# Patient Record
Sex: FEMALE | Race: WHITE | NOT HISPANIC OR LATINO | URBAN - METROPOLITAN AREA
[De-identification: names, ages, dates, MRNs, and addresses within clinical notes are randomized per-mention and may not be internally consistent; named-entity substitution may affect disease eponyms.]

---

## 2022-06-09 ENCOUNTER — OFFICE (OUTPATIENT)
Dept: URBAN - METROPOLITAN AREA CLINIC 5 | Facility: CLINIC | Age: 27
End: 2022-06-09
Payer: COMMERCIAL

## 2022-06-09 VITALS
HEART RATE: 82 BPM | HEIGHT: 64 IN | WEIGHT: 70 LBS | DIASTOLIC BLOOD PRESSURE: 66 MMHG | OXYGEN SATURATION: 97 % | TEMPERATURE: 97.1 F | SYSTOLIC BLOOD PRESSURE: 92 MMHG

## 2022-06-09 DIAGNOSIS — R63.4 ABNORMAL WEIGHT LOSS: ICD-10-CM

## 2022-06-09 DIAGNOSIS — E10.43 TYPE 1 DIABETES MELLITUS WITH DIABETIC AUTONOMIC (POLY)NEURO: ICD-10-CM

## 2022-06-09 DIAGNOSIS — K31.84 GASTROPARESIS: ICD-10-CM

## 2022-06-09 DIAGNOSIS — Z98.84 BARIATRIC SURGERY STATUS: ICD-10-CM

## 2022-06-09 DIAGNOSIS — R10.10 UPPER ABDOMINAL PAIN, UNSPECIFIED: ICD-10-CM

## 2022-06-09 PROCEDURE — 99204 OFFICE O/P NEW MOD 45 MIN: CPT | Performed by: INTERNAL MEDICINE

## 2022-06-09 RX ORDER — PANTOPRAZOLE SODIUM 40 MG/1
40 TABLET, DELAYED RELEASE ORAL
Qty: 30 | Refills: 11 | Status: ACTIVE
Start: 2022-06-09

## 2022-07-12 ENCOUNTER — ON CAMPUS - OUTPATIENT (OUTPATIENT)
Dept: URBAN - METROPOLITAN AREA HOSPITAL 108 | Facility: HOSPITAL | Age: 27
End: 2022-07-12
Payer: COMMERCIAL

## 2022-07-12 DIAGNOSIS — Z98.84 BARIATRIC SURGERY STATUS: ICD-10-CM

## 2022-07-12 DIAGNOSIS — R63.4 ABNORMAL WEIGHT LOSS: ICD-10-CM

## 2022-07-12 DIAGNOSIS — E46 UNSPECIFIED PROTEIN-CALORIE MALNUTRITION: ICD-10-CM

## 2022-07-12 PROCEDURE — 43241 EGD TUBE/CATH INSERTION: CPT | Performed by: INTERNAL MEDICINE

## 2022-07-13 ENCOUNTER — OFFICE (OUTPATIENT)
Dept: URBAN - METROPOLITAN AREA CLINIC 75 | Facility: CLINIC | Age: 27
End: 2022-07-13

## 2022-07-13 DIAGNOSIS — R63.4 ABNORMAL WEIGHT LOSS: ICD-10-CM

## 2022-08-12 ENCOUNTER — OFFICE (OUTPATIENT)
Dept: URBAN - METROPOLITAN AREA CLINIC 75 | Facility: CLINIC | Age: 27
End: 2022-08-12
Payer: COMMERCIAL

## 2022-08-12 VITALS
SYSTOLIC BLOOD PRESSURE: 110 MMHG | OXYGEN SATURATION: 98 % | DIASTOLIC BLOOD PRESSURE: 70 MMHG | HEART RATE: 107 BPM | HEIGHT: 64 IN | WEIGHT: 73 LBS

## 2022-08-12 DIAGNOSIS — E43 UNSPECIFIED SEVERE PROTEIN-CALORIE MALNUTRITION: ICD-10-CM

## 2022-08-12 PROCEDURE — 99214 OFFICE O/P EST MOD 30 MIN: CPT | Performed by: NURSE PRACTITIONER

## 2022-09-28 ENCOUNTER — OFFICE (OUTPATIENT)
Dept: URBAN - METROPOLITAN AREA CLINIC 75 | Facility: CLINIC | Age: 27
End: 2022-09-28
Payer: COMMERCIAL

## 2022-09-28 VITALS — WEIGHT: 71 LBS | HEIGHT: 64 IN

## 2022-09-28 DIAGNOSIS — K31.84 GASTROPARESIS: ICD-10-CM

## 2022-09-28 DIAGNOSIS — R63.4 ABNORMAL WEIGHT LOSS: ICD-10-CM

## 2022-09-28 DIAGNOSIS — E43 UNSPECIFIED SEVERE PROTEIN-CALORIE MALNUTRITION: ICD-10-CM

## 2022-09-28 DIAGNOSIS — E10.43 TYPE 1 DIABETES MELLITUS WITH DIABETIC AUTONOMIC (POLY)NEURO: ICD-10-CM

## 2022-09-28 PROCEDURE — 99214 OFFICE O/P EST MOD 30 MIN: CPT | Performed by: INTERNAL MEDICINE

## 2023-10-24 ENCOUNTER — OFFICE VISIT (OUTPATIENT)
Dept: GASTROENTEROLOGY | Facility: CLINIC | Age: 28
End: 2023-10-24
Payer: COMMERCIAL

## 2023-10-24 VITALS
HEART RATE: 101 BPM | DIASTOLIC BLOOD PRESSURE: 105 MMHG | HEIGHT: 64 IN | BODY MASS INDEX: 15.43 KG/M2 | WEIGHT: 90.4 LBS | SYSTOLIC BLOOD PRESSURE: 157 MMHG

## 2023-10-24 DIAGNOSIS — R10.84 GENERALIZED ABDOMINAL PAIN: ICD-10-CM

## 2023-10-24 DIAGNOSIS — R11.0 NAUSEA: ICD-10-CM

## 2023-10-24 DIAGNOSIS — K21.9 GASTROESOPHAGEAL REFLUX DISEASE, UNSPECIFIED WHETHER ESOPHAGITIS PRESENT: ICD-10-CM

## 2023-10-24 DIAGNOSIS — K31.84 GASTROPARESIS: Primary | ICD-10-CM

## 2023-10-24 PROBLEM — R55 SYNCOPE: Status: ACTIVE | Noted: 2020-07-25

## 2023-10-24 PROCEDURE — 1159F MED LIST DOCD IN RCRD: CPT | Performed by: NURSE PRACTITIONER

## 2023-10-24 PROCEDURE — 99204 OFFICE O/P NEW MOD 45 MIN: CPT | Performed by: NURSE PRACTITIONER

## 2023-10-24 PROCEDURE — 1160F RVW MEDS BY RX/DR IN RCRD: CPT | Performed by: NURSE PRACTITIONER

## 2023-10-24 RX ORDER — FAMOTIDINE 40 MG/1
TABLET, FILM COATED ORAL
COMMUNITY
Start: 2023-10-02

## 2023-10-24 RX ORDER — PROCHLORPERAZINE 25 MG/1
SUPPOSITORY RECTAL
COMMUNITY
Start: 2023-10-03

## 2023-10-24 RX ORDER — OMEPRAZOLE 20 MG/1
TABLET, DELAYED RELEASE ORAL
COMMUNITY
End: 2023-10-24 | Stop reason: SDUPTHER

## 2023-10-24 RX ORDER — LEVOTHYROXINE SODIUM 0.07 MG/1
TABLET ORAL
COMMUNITY
Start: 2023-10-07

## 2023-10-24 RX ORDER — CHOLECALCIFEROL (VITAMIN D3) 50 MCG
CAPSULE ORAL
COMMUNITY
Start: 2023-10-02

## 2023-10-24 RX ORDER — OMEPRAZOLE 20 MG/1
20 TABLET, DELAYED RELEASE ORAL DAILY
Qty: 90 TABLET | Refills: 2 | Status: SHIPPED | OUTPATIENT
Start: 2023-10-24

## 2023-10-24 RX ORDER — ONDANSETRON 8 MG/1
8 TABLET, ORALLY DISINTEGRATING ORAL EVERY 8 HOURS PRN
Qty: 30 TABLET | Refills: 1 | Status: SHIPPED | OUTPATIENT
Start: 2023-10-24

## 2023-10-24 RX ORDER — LISINOPRIL 2.5 MG/1
TABLET ORAL
COMMUNITY
Start: 2023-08-08

## 2023-10-24 RX ORDER — INSULIN LISPRO 100 [IU]/ML
INJECTION, SOLUTION INTRAVENOUS; SUBCUTANEOUS
COMMUNITY
Start: 2023-08-07

## 2023-10-24 NOTE — PROGRESS NOTES
"Chief Complaint     Abdominal Pain, Weight Loss, Nausea, and Elevated Hepatic Enzymes    History of Present Illness     Wanda Rivers is a 28 y.o. female who presents to [facility] on referral from Emily Arthur,Wolfgang for a gastroenterology evaluation of abdominal pain, nausea and weight loss.      Past medical history includes diabetes, gastric bypass, thyroid disease, anxiety and depression.  She reports being diagnosed with type 2 diabetes in .  It was recommended that she undergo gastric bypass for weight loss at this time to help with type 2 diabetes.  Reports that she weighed 201 pounds.  Underwent gastric bypass in  and \"almost .\"  Then was diagnosed with type 1 diabetes.  In  she was diagnosed with gastroparesis and was continuing to lose weight.  Her lowest weight was 69 pounds following the death of her father.  In  all of her teeth were removed.  Last July NJ tube was placed at [facility] however patient states that her weight loss worsened after her feeding tube placement and she requested that it be removed in October.  Recently received insulin pump and reports gaining 4 pounds since starting insulin pump.  She does not follow a gastroparesis diet and reports that she is really unsure of what gastroparesis diet consists of.    Reports upper abdominal pain that is worse following meals.  When pain is present it is improved with a heating pad and smoking marijuana.    Admits daily nausea.  Reports vomiting that's present if she eats too much or drinks too much with her food.  Will sometimes wake up with nausea and vomiting.      Reports that she's having a bowel movement every other day.      Reports that her weight is currently stable.  She has tried protein shakes in the past bust states that it hurts her stomach and gives her diarrhea.         History      Past Medical History:   Diagnosis Date    Diabetes mellitus type 1     Gastroparesis  " "      Past Surgical History:   Procedure Laterality Date    COLONOSCOPY      GASTRIC BYPASS      SMALL INTESTINE SURGERY      UPPER GASTROINTESTINAL ENDOSCOPY         Family History   Problem Relation Age of Onset    Colon cancer Neg Hx         Current Medications        Current Outpatient Medications:     Continuous Blood Gluc  (Dexcom G6 ) device, , Disp: , Rfl:     D3 Super Strength 50 MCG (2000 UT) capsule, , Disp: , Rfl:     famotidine (PEPCID) 40 MG tablet, , Disp: , Rfl:     Insulin Glargine (LANTUS SOLOSTAR) 100 UNIT/ML injection pen, Inject  under the skin into the appropriate area as directed Daily., Disp: , Rfl:     Insulin Lispro, 1 Unit Dial, (HUMALOG) 100 UNIT/ML solution pen-injector, , Disp: , Rfl:     levothyroxine (SYNTHROID, LEVOTHROID) 75 MCG tablet, , Disp: , Rfl:     lisinopril (PRINIVIL,ZESTRIL) 2.5 MG tablet, , Disp: , Rfl:     omeprazole OTC (PriLOSEC OTC) 20 MG EC tablet, Take 1 tablet by mouth Daily., Disp: 90 tablet, Rfl: 2    ondansetron ODT (ZOFRAN-ODT) 8 MG disintegrating tablet, Place 1 tablet on the tongue Every 8 (Eight) Hours As Needed for Nausea or Vomiting., Disp: 30 tablet, Rfl: 1     Allergies     No Known Allergies    Social History       Social History     Social History Narrative    Not on file       Immunizations     Immunization:    There is no immunization history on file for this patient.       Objective     Objective     Vital Signs:   BP (!) 157/105 (BP Location: Right arm, Patient Position: Sitting, Cuff Size: Small Adult)   Pulse 101   Ht 162.6 cm (64\")   Wt 41 kg (90 lb 6.4 oz)   BMI 15.52 kg/m²       Physical Exam    Results      Result Review :   The following data was reviewed by: ROSSI Celestin on 10/24/2023:    8/10/2023 CMP: Creatinine 0.61, alk phos 115, AST 67, ALT 72, total bilirubin 0.2.  CBC: Hemoglobin 10.4, hematocrit 35.6, platelets 402.  Hemoglobin A1c 9.7.    7/12/2022 EGD with NJ tube placement.  Normal esophagus.  " Examined duodenum was normal.  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  NJ tube placed.    9/1/2016 EGD/colonoscopy-normal esophagus.  Evidence of gastric bypass.  Normal stomach and jejunum.  small bowel biopsy with unremarkable mucosa.  Gastric biopsy-mild chronic gastritis. Normal colonoscopy.                 Assessment and Plan        Assessment and Plan    Diagnoses and all orders for this visit:    1. Gastroparesis (Primary)  -     NM Gastric Emptying; Future    2. Gastroesophageal reflux disease, unspecified whether esophagitis present  -     omeprazole OTC (PriLOSEC OTC) 20 MG EC tablet; Take 1 tablet by mouth Daily.  Dispense: 90 tablet; Refill: 2    3. Generalized abdominal pain  -     NM Gastric Emptying; Future    4. Nausea  -     NM Gastric Emptying; Future  -     ondansetron ODT (ZOFRAN-ODT) 8 MG disintegrating tablet; Place 1 tablet on the tongue Every 8 (Eight) Hours As Needed for Nausea or Vomiting.  Dispense: 30 tablet; Refill: 1        Follow Up        Follow Up   Return in about 6 months (around 4/24/2024) for abdominal pain.  Patient was given instructions and counseling regarding her condition or for health maintenance advice. Please see specific information pulled into the AVS if appropriate.

## 2023-11-03 ENCOUNTER — PATIENT ROUNDING (BHMG ONLY) (OUTPATIENT)
Dept: GASTROENTEROLOGY | Facility: CLINIC | Age: 28
End: 2023-11-03
Payer: COMMERCIAL

## 2023-11-03 NOTE — PROGRESS NOTES
11/3/2023      Hello, may I speak with Wanda Rivers     My name is Berta. I am calling from [facility]. I show that you had a recent visit with ROSSI Coronado.    Before we get started may I verify your date of birth? 1995    I am calling to officially welcome you to our practice and ask about your recent visit. Is this a good time to talk? Left voicemail     Tell me about your visit with us. What things went well?    We strive to ensure that we protect your safety and privacy. Is there anything we could have done to improve this during your visit?        We're always looking for ways to make our patients' experiences even better. Do you have recommendations on ways we may improve?    Overall were you satisfied with your first visit to our practice?    I appreciate you taking the time to speak with me today. Is there anything else I can do for you?    I am glad to hear that you had a very good visit and I appreciate you taking the time to provide feedback on this call. We would greatly appreciate you filling out a survey if you receive one in the mail, email or text. This is a great opportunity to provide any additional feedback that you may think of after this call as well.       Thank you, and have a great day.

## 2023-12-01 ENCOUNTER — TELEPHONE (OUTPATIENT)
Dept: GASTROENTEROLOGY | Facility: CLINIC | Age: 28
End: 2023-12-01
Payer: COMMERCIAL

## 2023-12-01 ENCOUNTER — HOSPITAL ENCOUNTER (OUTPATIENT)
Dept: NUCLEAR MEDICINE | Facility: HOSPITAL | Age: 28
Discharge: HOME | End: 2023-12-01
Payer: COMMERCIAL

## 2023-12-01 DIAGNOSIS — R10.84 GENERALIZED ABDOMINAL PAIN: ICD-10-CM

## 2023-12-01 DIAGNOSIS — K31.84 GASTROPARESIS: ICD-10-CM

## 2023-12-01 DIAGNOSIS — R11.0 NAUSEA: ICD-10-CM

## 2023-12-01 PROCEDURE — 78264 GASTRIC EMPTYING IMG STUDY: CPT

## 2023-12-01 PROCEDURE — 0 TECHNETIUM SULFUR COLLOID: Performed by: NURSE PRACTITIONER

## 2023-12-01 PROCEDURE — A9541 TC99M SULFUR COLLOID: HCPCS | Performed by: NURSE PRACTITIONER

## 2023-12-01 RX ADMIN — TECHNETIUM TC 99M SULFUR COLLOID 1 DOSE: KIT at 07:36

## 2023-12-01 NOTE — TELEPHONE ENCOUNTER
----- Message from ROSSI Celestin sent at 12/1/2023 10:41 AM EST -----  GES with normal gastric emptying time.

## 2023-12-04 NOTE — TELEPHONE ENCOUNTER
Pt is aware of providers response,  pt asked if Gastroparesis comes back consulted with Kandi RICHEY who states with the pt being diabetic if this is not managed it can damage the stomach and cause gastroparesis to return. Pt states understanding    no history of blood product transfusion

## 2023-12-04 NOTE — TELEPHONE ENCOUNTER
It can improve over time.  Gastric emptying time varies based on many factors.  If she had a previous gastric emptying study that showed a delay, it could have been related to something else (medications, activity, etc) but for now it appears that her gastric emptying time is normal.

## 2024-11-12 ENCOUNTER — TELEPHONE (OUTPATIENT)
Dept: GASTROENTEROLOGY | Facility: CLINIC | Age: 29
End: 2024-11-12

## 2024-11-12 ENCOUNTER — OFFICE VISIT (OUTPATIENT)
Dept: GASTROENTEROLOGY | Facility: CLINIC | Age: 29
End: 2024-11-12
Payer: MEDICARE

## 2024-11-12 VITALS
HEART RATE: 112 BPM | BODY MASS INDEX: 17.51 KG/M2 | OXYGEN SATURATION: 98 % | WEIGHT: 102 LBS | SYSTOLIC BLOOD PRESSURE: 112 MMHG | DIASTOLIC BLOOD PRESSURE: 84 MMHG

## 2024-11-12 DIAGNOSIS — T78.49XA OTHER ALLERGY, INITIAL ENCOUNTER: ICD-10-CM

## 2024-11-12 DIAGNOSIS — R11.2 NAUSEA AND VOMITING, UNSPECIFIED VOMITING TYPE: ICD-10-CM

## 2024-11-12 DIAGNOSIS — R19.4 ALTERED BOWEL HABITS: Primary | ICD-10-CM

## 2024-11-12 DIAGNOSIS — R19.7 DIARRHEA, UNSPECIFIED TYPE: ICD-10-CM

## 2024-11-12 DIAGNOSIS — R10.11 RUQ PAIN: ICD-10-CM

## 2024-11-12 PROCEDURE — 1160F RVW MEDS BY RX/DR IN RCRD: CPT

## 2024-11-12 PROCEDURE — 99214 OFFICE O/P EST MOD 30 MIN: CPT

## 2024-11-12 PROCEDURE — 1159F MED LIST DOCD IN RCRD: CPT

## 2024-11-12 RX ORDER — GABAPENTIN 300 MG/1
CAPSULE ORAL
COMMUNITY
Start: 2024-05-28

## 2024-11-12 RX ORDER — PROCHLORPERAZINE 25 MG/1
SUPPOSITORY RECTAL
COMMUNITY
Start: 2024-10-23

## 2024-11-12 RX ORDER — SODIUM PICOSULFATE, MAGNESIUM OXIDE, AND ANHYDROUS CITRIC ACID 10; 3.5; 12 MG/160ML; G/160ML; G/160ML
1 LIQUID ORAL 2 TIMES DAILY
Qty: 320 ML | Refills: 0 | Status: SHIPPED | OUTPATIENT
Start: 2024-11-12 | End: 2024-11-13

## 2024-11-12 RX ORDER — BUSPIRONE HYDROCHLORIDE 5 MG/1
TABLET ORAL
COMMUNITY
Start: 2024-11-07

## 2024-11-12 RX ORDER — INSULIN ASPART 100 [IU]/ML
INJECTION, SOLUTION INTRAVENOUS; SUBCUTANEOUS
COMMUNITY

## 2024-11-12 RX ORDER — INSULIN PMP CART,AUT,G6/7,CNTR
EACH SUBCUTANEOUS
COMMUNITY
Start: 2024-11-07

## 2024-11-12 RX ORDER — GLUCAGON INJECTION, SOLUTION 1 MG/.2ML
INJECTION, SOLUTION SUBCUTANEOUS
COMMUNITY

## 2024-11-12 RX ORDER — SERTRALINE HYDROCHLORIDE 100 MG/1
TABLET, FILM COATED ORAL
COMMUNITY
Start: 2024-11-07

## 2024-11-12 NOTE — TELEPHONE ENCOUNTER
Wanda Rivers, 1995, has requested to transfer care from ROSSI Coronado to ROSSI Hanna.    Reason for transfer: Pt was in office and would like to change providers    Please review the patients records for possible transfer of care. The patient is aware that it is at the receiving provider's discretion to approve or deny this transfer request.       ROSSI Berrios verbally approved transfer

## 2024-11-12 NOTE — PROGRESS NOTES
"Chief Complaint     Diabetic gastroparesis (associated with type 1 diabetes mellitus/), decreased appetite (Pt every few months pt sates she is unable to eat anything, it causes nausea, abd pain, light headed/dizzy and pt states an increased heart rate when this happens), Abdominal Pain (Pt states when she eats or drink anything she will have abd pain), and Diarrhea    History of Present Illness     Wanda Rivers is a 29 y.o. female with PMH diabetes, gastric bypass, thyroid disease, who presents to [facility] on referral from Saima Truong MD for a gastroenterology evaluation of diabetic gastroparesis associated with type 1 diabetes mellitus..      HPI:  Patient was previously seen by ROSSI Coronado for diabetic gastroparesis associated with type 1 diabetes mellitus. She reports being diagnosed with type 2 diabetes in .  It was recommended that she undergo gastric bypass for weight loss at this time to help with type 2 diabetes.  Reports that she weighed 201 pounds.  Underwent gastric bypass in  and \"almost .\"  Then was diagnosed with type 1 diabetes. In  she was diagnosed with gastroparesis and was continuing to lose weight.  Her lowest weight was 69 pounds following the death of her father. In  all of her teeth were removed. Last July NJ tube was placed at [facility] however patient states that her weight loss worsened after her feeding tube placement and she requested that it be removed in October.  Reports upper abdominal pain that is worse following meals.  When pain is present it is improved with a heating pad and smoking marijuana. She reported trouble with nausea and vomiting after meals. Her symptoms improved after Omnipod and Dexcom use for the management of her diabetes.     2023 NM gastric emptying study: normal.     2024 Office Visit: Patient reports episodes of nausea, vomiting, dry heaving, and abdominal pain that can be " "trigger by eating and drinking. She explains these episodes cause her to lose weight. She explains she is current at the end of a recent \"episode\". Over the past two and a half weeks she lost 14 lbs. Experiences breakthrough heartburn despite taking Pepcid and Omeprazole. Denies trouble swallowing. Over the past few months her diet consists of eating eggs, peanut butter sandwiches, Ramen noodles, grilled cheese, Wendys, and Dunkins. She reports her A1C is 6.2 down from 12 in Dec 2023. Her average blood sugar level is 128 on her Dexcom. Reports GI intolerance to protein supplements.     Her typical bowel regiment consists of 1 BM every 1-2 days. Typical stool is formed, semi-hard, and light brown. During an \"episode\" stool described as diarrhea, mucoid, with variations of yellow, tan, and brown in color. Denies hematochezia or melena. She reports epigastric pain and RUQ pain described as a burning and sore pain, aggravated by eating. Alleviated by heat and massages. Lower abdominal pain described as aching, cramping, burning, and stabbing pain. Relieved with back massage. Denies aggravating factors.     Patient denies family history of colon cancer or of colon polyps. Patient's last EGD/Colonoscopy 7/12/2022 and had a feeding tube placed (removed after 2-3 months).     History      Past Medical History:   Diagnosis Date    Diabetes mellitus type 1     Gastroparesis        Past Surgical History:   Procedure Laterality Date    COLONOSCOPY      GASTRIC BYPASS      SMALL INTESTINE SURGERY      UPPER GASTROINTESTINAL ENDOSCOPY         Family History   Problem Relation Age of Onset    Colon cancer Neg Hx         Current Medications        Current Outpatient Medications:     Continuous Blood Gluc  (Dexcom G6 ) device, , Disp: , Rfl:     Continuous Glucose Sensor (Dexcom G6 Sensor), , Disp: , Rfl:     Continuous Glucose Transmitter (Dexcom G6 Transmitter) misc, , Disp: , Rfl:     D3 Super Strength 50 MCG " (2000 UT) capsule, , Disp: , Rfl:     famotidine (PEPCID) 40 MG tablet, , Disp: , Rfl:     Glucagon (Gvoke Kit) 1 MG/0.2ML solution, , Disp: , Rfl:     Insulin Disposable Pump (Omnipod 5 [id number] Pods Gen 5) misc, , Disp: , Rfl:     ondansetron ODT (ZOFRAN-ODT) 8 MG disintegrating tablet, Place 1 tablet on the tongue Every 8 (Eight) Hours As Needed for Nausea or Vomiting., Disp: 30 tablet, Rfl: 1    sertraline (ZOLOFT) 100 MG tablet, TAKE 1 TABLET BY MOUTH DAILY FOR MOOD, Disp: , Rfl:     busPIRone (BUSPAR) 5 MG tablet, TAKE 1 TABLET BY MOUTH 2 TIMES A DAY FOR ANXIETY (Patient not taking: Reported on 11/12/2024), Disp: , Rfl:     Diclofenac Sodium (VOLTAREN) 1 % gel gel, , Disp: , Rfl:     gabapentin (NEURONTIN) 300 MG capsule, Take 1 capsule 3 times a day by oral route for 7 days. (Patient not taking: Reported on 11/12/2024), Disp: , Rfl:     Insulin Glargine (LANTUS SOLOSTAR) 100 UNIT/ML injection pen, Inject  under the skin into the appropriate area as directed Daily., Disp: , Rfl:     Insulin Lispro, 1 Unit Dial, (HUMALOG) 100 UNIT/ML solution pen-injector, , Disp: , Rfl:     levothyroxine (SYNTHROID, LEVOTHROID) 75 MCG tablet, , Disp: , Rfl:     lisinopril (PRINIVIL,ZESTRIL) 2.5 MG tablet, , Disp: , Rfl:     NovoLOG 100 UNIT/ML injection, , Disp: , Rfl:     omeprazole OTC (PriLOSEC OTC) 20 MG EC tablet, Take 1 tablet by mouth Daily. (Patient not taking: Reported on 11/12/2024), Disp: 90 tablet, Rfl: 2    sertraline (ZOLOFT) 50 MG tablet, , Disp: , Rfl:     Sod Picosulfate-Mag Ox-Cit Acd (Clenpiq) 10-3.5-12 MG-GM -GM/160ML solution, Take 160 mL by mouth 2 (Two) Times a Day for 1 day. Take per office instructions, Disp: 320 mL, Rfl: 0     Allergies     No Known Allergies    Social History       Social History     Social History Narrative    Not on file       Immunizations     Immunization:    There is no immunization history on file for this patient.       Objective     Objective     Vital Signs:   /84 (BP  "Location: Right arm, Patient Position: Sitting, Cuff Size: Adult)   Pulse 112   Wt 46.3 kg (102 lb)   SpO2 98%   BMI 17.51 kg/m²       Physical Exam  HENT:      Head: Normocephalic.   Eyes:      Pupils: Pupils are equal, round, and reactive to light.   Cardiovascular:      Rate and Rhythm: Normal rate.   Pulmonary:      Effort: Pulmonary effort is normal.   Abdominal:      General: Abdomen is flat.      Palpations: Abdomen is soft.      Tenderness: There is abdominal tenderness in the right upper quadrant and epigastric area.   Musculoskeletal:         General: Normal range of motion.   Neurological:      General: No focal deficit present.      Mental Status: She is alert.   Psychiatric:         Mood and Affect: Mood normal.         Results      Result Review :   The following data was reviewed by: ROSSI Hanna on 11/12/2024:    No results for input(s): \"WBC\", \"HGB\", \"MCV\", \"PLT\" in the last 44337 hours.    Invalid input(s): \"NEUT\"      No results for input(s): \"BUN\", \"CREATININE\", \"NA\", \"K\", \"CL\", \"CO2\", \"GLUCOSE\" in the last 75457 hours.   No results for input(s): \"PROTIME\", \"INR\", \"PTT\" in the last 09059 hours.  No results for input(s): \"AST\", \"ALT\", \"ALKPHOS\", \"BILITOT\", \"BILIDIR\", \"PROTEINTOT\", \"ALBUMIN\" in the last 08669 hours.   No results for input(s): \"IRON\", \"TRANSFERRIN\", \"TIBC\", \"FERRITIN\", \"JGTXEQVC65\", \"FOLATE\" in the last 51031 hours.  No results for input(s): \"HAV\", \"HEPAIGM\", \"HEPBIGM\", \"HEPBCAB\", \"HBEAG\", \"HEPCAB\" in the last 73705 hours.    No Images in the past 120 days found..       Assessment and Plan        Assessment and Plan    Diagnoses and all orders for this visit:    1. Altered bowel habits (Primary)  -     CBC (No Diff)  -     Comprehensive Metabolic Panel  -     Case Request; Standing  -     Case Request  -     Sod Picosulfate-Mag Ox-Cit Acd (Clenpiq) 10-3.5-12 MG-GM -GM/160ML solution; Take 160 mL by mouth 2 (Two) Times a Day for 1 day. Take per office " instructions  Dispense: 320 mL; Refill: 0    2. Diarrhea, unspecified type  -     Enteric Bacterial Panel - Stool, Per Rectum; Future  -     Enteric Parasite Panel - Stool, Per Rectum; Future  -     Fecal Lactoferrin Qual. - Stool, Per Rectum; Future  -     Clostridioides difficile Toxin - Stool, Per Rectum; Future  -     Occult Blood, Fecal By Immunoassay - Stool, Per Rectum; Future  -     Calprotectin, Fecal - Stool, Per Rectum; Future  -     Pancreatic Elastase, Fecal - Stool, Per Rectum; Future  -     Case Request; Standing  -     Case Request  -     Sod Picosulfate-Mag Ox-Cit Acd (Clenpiq) 10-3.5-12 MG-GM -GM/160ML solution; Take 160 mL by mouth 2 (Two) Times a Day for 1 day. Take per office instructions  Dispense: 320 mL; Refill: 0    3. Nausea and vomiting, unspecified vomiting type  -     Alpha-Gal IgE Panel; Future  -     Celiac Disease Panel; Future  -     Case Request; Standing  -     Case Request  -     Sod Picosulfate-Mag Ox-Cit Acd (Clenpiq) 10-3.5-12 MG-GM -GM/160ML solution; Take 160 mL by mouth 2 (Two) Times a Day for 1 day. Take per office instructions  Dispense: 320 mL; Refill: 0    4. Other allergy, initial encounter  -     Alpha-Gal IgE Panel; Future    5. RUQ pain  -     US Abdomen Limited    Other orders  -     Follow Anesthesia Guidelines / Protocol; Standing  -     Follow Anesthesia Guidelines / Protocol; Future  -     Verify NPO; Standing  -     Verify Bowel Prep Was Successful; Standing  -     Give Tap Water Enema If Bowel Prep Insufficient; Standing  -     POC Urine Pregnancy; Standing        ESOPHAGOGASTRODUODENOSCOPY AND COLONOSCOPY:A flexible tube that has a camera and light at the end will pass through the oral cavity into the esophagus (food tube), stomach and upper part of the small bowel and another flexible tube with a camera and light at the end will pass from the rear end to visualize the large bowel with possible removal of protruded tissue to send for testing. (N/A),  ESOPHAGOGASTRODUODENOSCOPY AND COLONOSCOPY (N/A)      Follow Up        Follow Up   Return for Follow up after EGD/Colonoscopy.    Plan & Recommendations  I have recommended that the patient undergo further evaluation with an EGD and colonoscopy due to nausea and vomiting and altered bowel habits, please obtain left and right colon biopsies.  I have discussed this procedure in detail with the patient.  I have discussed the risks, benefits, and alternatives.  I have discussed the risk of anesthesia, bleeding and perforation.  Patient understands these risks, benefits, and alternatives and wishes to proceed.  I will schedule her at her earliest convenience. Clenpiq bowel prep ordered.  Stool studies ordered to rule out infectious or inflammatory causes of the diarrhea.  Pancreatic elastase, alpha gal panel, and celiac disease panel ordered to rule out other possible causes of diarrhea.    Patient was given instructions and counseling regarding her condition or for health maintenance advice. Please see specific information pulled into the AVS if appropriate.

## 2024-11-12 NOTE — PATIENT INSTRUCTIONS
Stay away from or limit high fat foods and foods that have lots of fiber, such as oranges and broccoli. These can be hard to digest. Chew food fully.  Consider eating six small meals a day instead of three large ones. Eating less food may make it easier for the stomach to empty, because it is not as full.  Utilize meal replacement shakes that are low in sugar.  Emphasized the importance of good glycemic control.

## 2024-11-14 ENCOUNTER — PATIENT ROUNDING (BHMG ONLY) (OUTPATIENT)
Dept: GASTROENTEROLOGY | Facility: CLINIC | Age: 29
End: 2024-11-14
Payer: MEDICARE

## 2024-11-14 ENCOUNTER — TELEPHONE (OUTPATIENT)
Dept: GASTROENTEROLOGY | Facility: CLINIC | Age: 29
End: 2024-11-14
Payer: MEDICARE

## 2024-11-14 NOTE — TELEPHONE ENCOUNTER
PA required for Clenpiq. Submitted request via covermymeds, awaiting response.    PA APPROVED  Approved today by [employer] 2017  PA Case: [id number], Status: Approved, Coverage Starts on: 8/15/2024 12:00:00 AM, Coverage Ends on: 11/14/2025 12:00:00 AM.  Authorization Expiration Date: 11/13/2025

## 2024-11-14 NOTE — PROGRESS NOTES
"11/14/2024      Hello, may I speak with Wanda Rivers     My name is Joe. I am calling from [facility]. I show that you had a recent visit with ROSSI Hanna.    Before we get started may I verify your date of birth? 1995    I am calling to officially welcome you to our practice and ask about your recent visit. Is this a good time to talk? Yes    Tell me about your visit with us. What things went well? \"My visit with Shirley went well. I really liked her.\"    We strive to ensure that we protect your safety and privacy. Is there anything we could have done to improve this during your visit? No        We're always looking for ways to make our patients' experiences even better. Do you have recommendations on ways we may improve? No    Overall were you satisfied with your first visit to our practice? Yes    I appreciate you taking the time to speak with me today. Is there anything else I can do for you? No    I am glad to hear that you had a very good visit and I appreciate you taking the time to provide feedback on this call. We would greatly appreciate you filling out a survey if you receive one in the mail, email or text. This is a great opportunity to provide any additional feedback that you may think of after this call as well.       Thank you, and have a great day.  "

## 2024-11-18 NOTE — PRE-PROCEDURE INSTRUCTIONS
"Instructed on date and arrival time of 1000. Instructed that arrival time is not their procedure time but allows time to prepare for procedure.  Come to entrance \"C\". Must have  over age [address] to drive home.  May have two visitors; however, children under 12 must stay in waiting room.  Discussed clear liquid diet (no red or purple), bowel prep, and NPO.  May take medications as usual except for blood thinners, diabetic medications, and weight loss medications.  Verbalized understanding of instructions given.  Instructed to call for questions or concerns.  "

## 2024-12-02 ENCOUNTER — TELEPHONE (OUTPATIENT)
Dept: GASTROENTEROLOGY | Facility: CLINIC | Age: 29
End: 2024-12-02
Payer: MEDICARE

## 2024-12-12 ENCOUNTER — TELEPHONE (OUTPATIENT)
Dept: GASTROENTEROLOGY | Facility: CLINIC | Age: 29
End: 2024-12-12
Payer: MEDICARE

## 2024-12-12 NOTE — TELEPHONE ENCOUNTER
Contacted pt to r/s f/u appt with ROSSI Hanna for after pts endo procedure. Pt agreeable to new date and time

## 2025-01-03 ENCOUNTER — HOSPITAL ENCOUNTER (OUTPATIENT)
Dept: ULTRASOUND IMAGING | Facility: HOSPITAL | Age: 30
Discharge: HOME | End: 2025-01-03
Payer: MEDICARE

## 2025-01-03 PROCEDURE — 76705 ECHO EXAM OF ABDOMEN: CPT

## 2025-01-06 DIAGNOSIS — R10.11 RUQ PAIN: Primary | ICD-10-CM

## 2025-01-07 ENCOUNTER — TELEPHONE (OUTPATIENT)
Dept: GASTROENTEROLOGY | Facility: CLINIC | Age: 30
End: 2025-01-07
Payer: MEDICARE

## 2025-01-07 NOTE — TELEPHONE ENCOUNTER
----- Message from Shirley Champagne sent at 1/6/2025 11:28 AM EST -----  US abdomen negative for acute abnormalities. HIDA scan ordered due to RUQ pain.

## 2025-01-15 NOTE — PRE-PROCEDURE INSTRUCTIONS
"Called pt in regards to procedure on 1/22/25.Pt told to arrive at 0630 at entrance \"C\" and explained that this is an arrival time, not the procedure time. Expect to be here for 3 to 4 hours.     Must have  over the age of 18 to drive home. May have two visitors; however, children under the age of 12 must stay in waiting room with an adult.     Educated on clear liquid diet(no red or purple), bowel prep, and NPO. Pt was told that nothing can be placed in mouth two hours prior to arrival including gum, mints, cigarettes,vape.    Meds must be taken at least two hours prior to arrival except for diabetic medications, blood thinners, and diuretics; they must be held the night prior and the day of procedure. Weight-loss injections with weekly doses must be held a week prior.     Pt verbalized understanding and instructed to call back for any questions or concerns.  "

## 2025-01-21 ENCOUNTER — ANESTHESIA EVENT (OUTPATIENT)
Dept: GASTROENTEROLOGY | Facility: HOSPITAL | Age: 30
End: 2025-01-21
Payer: MEDICARE

## 2025-01-21 NOTE — ANESTHESIA PREPROCEDURE EVALUATION
Anesthesia Evaluation     Patient summary reviewed and Nursing notes reviewed   NPO Solid Status: > 8 hours  NPO Liquid Status: > 8 hours           Airway   Mallampati: I  TM distance: >3 FB  Neck ROM: full  No difficulty expected  Dental    (+) edentulous    Pulmonary     breath sounds clear to auscultation  Cardiovascular - normal exam  Exercise tolerance: good (4-7 METS)    Rhythm: regular  Rate: normal        Neuro/Psych  (+) syncope  GI/Hepatic/Renal/Endo    (+) diabetes mellitus type 1 well controlled using insulin, thyroid problem hypothyroidism    ROS Comment: Hx of gastric bypass    Musculoskeletal     Abdominal    Substance History      OB/GYN          Other        ROS/Med Hx Other: Altered bowel habits     01/22/25 0808  Pregnancy, Urine - Urine, Clean Catch  HCG, Urine QL Negative                Phys Exam Other: Has facial piercings - unable to remove nasal and lower right lip piercing - will tape but made patient aware that biteblock may result in skin tear , etc to lip, mouth , pt verb understanding     Zofran 4 mg IV in preop for nausea     Pt has insulin pump on but is suspended for 2 hours                 Anesthesia Plan    ASA 3     general   total IV anesthesia  (Total IV Anesthesia    Patient understands anesthesia not responsible for dental damage.      Discussed risks with pt including aspiration, allergic reactions, apnea, advanced airway placement. Pt verbalized understanding. All questions answered.     )  intravenous induction     Anesthetic plan, risks, benefits, and alternatives have been provided, discussed and informed consent has been obtained with: patient and spouse/significant other.  Pre-procedure education provided  Plan discussed with CRNA.      CODE STATUS:

## 2025-01-22 ENCOUNTER — HOSPITAL ENCOUNTER (OUTPATIENT)
Facility: HOSPITAL | Age: 30
Setting detail: HOSPITAL OUTPATIENT SURGERY
Discharge: HOME | End: 2025-01-22
Attending: INTERNAL MEDICINE | Admitting: INTERNAL MEDICINE
Payer: MEDICARE

## 2025-01-22 ENCOUNTER — ANESTHESIA (OUTPATIENT)
Dept: GASTROENTEROLOGY | Facility: HOSPITAL | Age: 30
End: 2025-01-22
Payer: MEDICARE

## 2025-01-22 VITALS
WEIGHT: 97 LBS | RESPIRATION RATE: 16 BRPM | HEART RATE: 101 BPM | BODY MASS INDEX: 16.56 KG/M2 | OXYGEN SATURATION: 100 % | TEMPERATURE: 96.3 F | DIASTOLIC BLOOD PRESSURE: 73 MMHG | HEIGHT: 64 IN | SYSTOLIC BLOOD PRESSURE: 97 MMHG

## 2025-01-22 DIAGNOSIS — R11.2 NAUSEA AND VOMITING, UNSPECIFIED VOMITING TYPE: ICD-10-CM

## 2025-01-22 DIAGNOSIS — R19.4 ALTERED BOWEL HABITS: ICD-10-CM

## 2025-01-22 DIAGNOSIS — R19.7 DIARRHEA, UNSPECIFIED TYPE: ICD-10-CM

## 2025-01-22 LAB
B-HCG UR QL: NEGATIVE
GLUCOSE BLDC GLUCOMTR-MCNC: 119 MG/DL (ref 70–99)
GLUCOSE BLDC GLUCOMTR-MCNC: 224 MG/DL (ref 70–99)

## 2025-01-22 PROCEDURE — 25810000003 LACTATED RINGERS PER 1000 ML: Performed by: NURSE ANESTHETIST, CERTIFIED REGISTERED

## 2025-01-22 PROCEDURE — 88305 TISSUE EXAM BY PATHOLOGIST: CPT | Performed by: INTERNAL MEDICINE

## 2025-01-22 PROCEDURE — 25010000002 LIDOCAINE PF 2% 2 % SOLUTION

## 2025-01-22 PROCEDURE — 82948 REAGENT STRIP/BLOOD GLUCOSE: CPT

## 2025-01-22 PROCEDURE — 25010000002 ONDANSETRON PER 1 MG

## 2025-01-22 PROCEDURE — 81025 URINE PREGNANCY TEST: CPT | Performed by: NURSE ANESTHETIST, CERTIFIED REGISTERED

## 2025-01-22 PROCEDURE — 25010000002 PROPOFOL 10 MG/ML EMULSION

## 2025-01-22 RX ORDER — PROPOFOL 10 MG/ML
VIAL (ML) INTRAVENOUS AS NEEDED
Status: DISCONTINUED | OUTPATIENT
Start: 2025-01-22 | End: 2025-01-22 | Stop reason: SURG

## 2025-01-22 RX ORDER — EPHEDRINE SULFATE 50 MG/ML
INJECTION INTRAVENOUS AS NEEDED
Status: DISCONTINUED | OUTPATIENT
Start: 2025-01-22 | End: 2025-01-22 | Stop reason: SURG

## 2025-01-22 RX ORDER — EPHEDRINE SULFATE 50 MG/ML
INJECTION, SOLUTION INTRAVENOUS AS NEEDED
Status: DISCONTINUED | OUTPATIENT
Start: 2025-01-22 | End: 2025-01-22

## 2025-01-22 RX ORDER — SODIUM CHLORIDE, SODIUM LACTATE, POTASSIUM CHLORIDE, CALCIUM CHLORIDE 600; 310; 30; 20 MG/100ML; MG/100ML; MG/100ML; MG/100ML
30 INJECTION, SOLUTION INTRAVENOUS CONTINUOUS
Status: DISCONTINUED | OUTPATIENT
Start: 2025-01-22 | End: 2025-01-22 | Stop reason: HOSPADM

## 2025-01-22 RX ORDER — PHENYLEPHRINE HCL IN 0.9% NACL 1 MG/10 ML
SYRINGE (ML) INTRAVENOUS AS NEEDED
Status: DISCONTINUED | OUTPATIENT
Start: 2025-01-22 | End: 2025-01-22 | Stop reason: SURG

## 2025-01-22 RX ORDER — LEVOTHYROXINE SODIUM 88 UG/1
88 TABLET ORAL
COMMUNITY

## 2025-01-22 RX ORDER — ONDANSETRON 2 MG/ML
4 INJECTION INTRAMUSCULAR; INTRAVENOUS ONCE
Status: COMPLETED | OUTPATIENT
Start: 2025-01-22 | End: 2025-01-22

## 2025-01-22 RX ORDER — SUCRALFATE 1 G/1
1 TABLET ORAL 4 TIMES DAILY
Qty: 120 TABLET | Refills: 2 | Status: SHIPPED | OUTPATIENT
Start: 2025-01-22 | End: 2025-04-22

## 2025-01-22 RX ORDER — ACYCLOVIR 400 MG/1
TABLET ORAL
COMMUNITY

## 2025-01-22 RX ORDER — PANTOPRAZOLE SODIUM 40 MG/1
40 TABLET, DELAYED RELEASE ORAL DAILY
Qty: 90 TABLET | Refills: 1 | Status: SHIPPED | OUTPATIENT
Start: 2025-01-22 | End: 2025-07-21

## 2025-01-22 RX ORDER — LIDOCAINE HYDROCHLORIDE 20 MG/ML
INJECTION, SOLUTION EPIDURAL; INFILTRATION; INTRACAUDAL; PERINEURAL AS NEEDED
Status: DISCONTINUED | OUTPATIENT
Start: 2025-01-22 | End: 2025-01-22 | Stop reason: SURG

## 2025-01-22 RX ADMIN — LIDOCAINE HYDROCHLORIDE 40 MG: 20 INJECTION, SOLUTION INTRAVENOUS at 09:30

## 2025-01-22 RX ADMIN — PROPOFOL 30 MG: 10 INJECTION, EMULSION INTRAVENOUS at 09:01

## 2025-01-22 RX ADMIN — PROPOFOL 30 MG: 10 INJECTION, EMULSION INTRAVENOUS at 09:32

## 2025-01-22 RX ADMIN — EPHEDRINE SULFATE 10 MG: 50 INJECTION INTRAVENOUS at 09:33

## 2025-01-22 RX ADMIN — PROPOFOL 40 MG: 10 INJECTION, EMULSION INTRAVENOUS at 09:06

## 2025-01-22 RX ADMIN — Medication 100 MCG: at 09:52

## 2025-01-22 RX ADMIN — PROPOFOL 275 MCG/KG/MIN: 10 INJECTION, EMULSION INTRAVENOUS at 08:59

## 2025-01-22 RX ADMIN — PROPOFOL 50 MG: 10 INJECTION, EMULSION INTRAVENOUS at 08:59

## 2025-01-22 RX ADMIN — ONDANSETRON 4 MG: 2 INJECTION INTRAMUSCULAR; INTRAVENOUS at 08:20

## 2025-01-22 RX ADMIN — SODIUM CHLORIDE, POTASSIUM CHLORIDE, SODIUM LACTATE AND CALCIUM CHLORIDE: 600; 310; 30; 20 INJECTION, SOLUTION INTRAVENOUS at 08:56

## 2025-01-22 RX ADMIN — EPHEDRINE SULFATE 5 MG: 50 INJECTION INTRAVENOUS at 09:52

## 2025-01-22 RX ADMIN — SODIUM CHLORIDE, POTASSIUM CHLORIDE, SODIUM LACTATE AND CALCIUM CHLORIDE 30 ML/HR: 600; 310; 30; 20 INJECTION, SOLUTION INTRAVENOUS at 08:09

## 2025-01-22 RX ADMIN — LIDOCAINE HYDROCHLORIDE 10 MG: 20 INJECTION, SOLUTION INTRAVENOUS at 08:59

## 2025-01-22 RX ADMIN — Medication 50 MCG: at 09:44

## 2025-01-22 NOTE — ANESTHESIA POSTPROCEDURE EVALUATION
Patient: Wanda Rivers    Procedure Summary       Date: 01/22/25 Room / Location: [facility] ENDOSCOPY 5 / [facility] ENDOSCOPY    Anesthesia Start: 0855 Anesthesia Stop: 0959    Procedures:       ESOPHAGOGASTRODUODENOSCOPY      COLONOSCOPY WITH BIOPSIES Diagnosis:       Altered bowel habits      Diarrhea, unspecified type      Nausea and vomiting, unspecified vomiting type      (Altered bowel habits [R19.4])      (Diarrhea, unspecified type [R19.7])      (Nausea and vomiting, unspecified vomiting type [R11.2])    Surgeons: Arnoldo Mandel MD Provider: Adiel Roe CRNA    Anesthesia Type: general ASA Status: 3            Anesthesia Type: general    Vitals  Vitals Value Taken Time   /66 01/22/25 1020   Temp 35.7 °C (96.3 °F) 01/22/25 1010   Pulse 101 01/22/25 1021   Resp 16 01/22/25 1010   SpO2 100 % 01/22/25 1021   Vitals shown include unfiled device data.        Post Anesthesia Care and Evaluation    Post-procedure mental status: acceptable.  Pain management: satisfactory to patient    Airway patency: patent  Anesthetic complications: No anesthetic complications    Cardiovascular status: acceptable  Respiratory status: acceptable    Comments: Per chart review

## 2025-01-22 NOTE — H&P
Pre Procedure History & Physical    Chief Complaint: Nausea, vomiting, right upper quadrant abdominal pain, diarrhea    HPI: Patient is 29 years old female had been seen in GI office by Shirley Pineda on 11/12/2024 for symptoms of nausea, vomiting and right upper quadrant abdominal pain with food.  Patient also complaining of BM.    Past Medical History:   Past Medical History:   Diagnosis Date    Diabetes mellitus type 1     Gastroparesis        Past Surgical History:  Past Surgical History:   Procedure Laterality Date    COLONOSCOPY      GASTRIC BYPASS      SMALL INTESTINE SURGERY      UPPER GASTROINTESTINAL ENDOSCOPY         Family History:  Family History   Problem Relation Age of Onset    Colon cancer Neg Hx        Social History:   reports that she has never smoked. She has never used smokeless tobacco. She reports that she does not currently use alcohol. Drug use questions deferred to the physician.    Medications:   Medications Prior to Admission   Medication Sig Dispense Refill Last Dose/Taking    busPIRone (BUSPAR) 5 MG tablet TAKE 1 TABLET BY MOUTH 2 TIMES A DAY FOR ANXIETY (Patient not taking: Reported on 11/12/2024)       Continuous Blood Gluc  (Dexcom G6 ) device        Continuous Glucose Sensor (Dexcom G6 Sensor)        Continuous Glucose Transmitter (Dexcom G6 Transmitter) misc        D3 Super Strength 50 MCG (2000 UT) capsule        Diclofenac Sodium (VOLTAREN) 1 % gel gel  (Patient not taking: Reported on 11/12/2024)       famotidine (PEPCID) 40 MG tablet        gabapentin (NEURONTIN) 300 MG capsule Take 1 capsule 3 times a day by oral route for 7 days. (Patient not taking: Reported on 11/12/2024)       Glucagon (Gvoke Kit) 1 MG/0.2ML solution        Insulin Disposable Pump (Omnipod 5 [id number] Pods Gen 5) misc        lisinopril (PRINIVIL,ZESTRIL) 2.5 MG tablet  (Patient not taking: Reported on 11/12/2024)       NovoLOG 100 UNIT/ML injection  (Patient not taking: Reported on  "11/12/2024)       omeprazole OTC (PriLOSEC OTC) 20 MG EC tablet Take 1 tablet by mouth Daily. (Patient not taking: Reported on 11/12/2024) 90 tablet 2     ondansetron ODT (ZOFRAN-ODT) 8 MG disintegrating tablet Place 1 tablet on the tongue Every 8 (Eight) Hours As Needed for Nausea or Vomiting. 30 tablet 1     sertraline (ZOLOFT) 100 MG tablet TAKE 1 TABLET BY MOUTH DAILY FOR MOOD       sertraline (ZOLOFT) 50 MG tablet  (Patient not taking: Reported on 11/12/2024)          Allergies:  Patient has no known allergies.      Objective     Blood pressure (!) 144/106, pulse 95, temperature 97 °F (36.1 °C), temperature source Temporal, resp. rate 16, height 162.6 cm (64\"), weight 44 kg (97 lb), SpO2 100%.    Physical Exam   Constitutional: Pt is oriented to person, place, and time and well-developed, well-nourished, and in no distress.   Mouth/Throat: Oropharynx is clear and moist.   Neck: Normal range of motion.   Cardiovascular: Normal rate, regular rhythm and normal heart sounds.    Pulmonary/Chest: Effort normal and breath sounds normal.   Abdominal: Soft. Nontender  Skin: Skin is warm and dry.   Psychiatric: Mood, memory, affect and judgment normal.     Assessment & Plan     Diagnosis:    Loses stool/chronic diarrhea  Nausea and vomiting  Right upper quadrant abdominal pain    Anticipated Surgical Procedure:    EGD and colonoscopy    The risks, benefits, and alternatives of this procedure have been discussed with the patient or the responsible party- the patient understands and agrees to proceed.        Electronically signed by Arnoldo Mandel MD, 01/22/25, 7:45 AM EST.            "

## 2025-01-23 LAB
CYTO UR: NORMAL
LAB AP CASE REPORT: NORMAL
LAB AP CLINICAL INFORMATION: NORMAL
PATH REPORT.FINAL DX SPEC: NORMAL
PATH REPORT.GROSS SPEC: NORMAL

## 2025-01-27 ENCOUNTER — TELEPHONE (OUTPATIENT)
Dept: GASTROENTEROLOGY | Facility: CLINIC | Age: 30
End: 2025-01-27
Payer: MEDICARE

## 2025-01-27 NOTE — TELEPHONE ENCOUNTER
----- Message from Shirley Champagne sent at 1/23/2025  1:19 PM EST -----  I have reviewed upper endoscopy. No specimens collected. Gastric bypass diet. No aspirin, ibuprofen, naproxen, or other non-steroidal anti-inflammatory drugs. Protonix 40 mg orally daily and Carafate 1gm slurry 4 times daily. No repeat EGD recommended at this time.    I have reviewed the patients colonoscopy report.  Right and left colon biopsies negative for microscopic colitis. The report showed a normal colonoscopy.  Repeat colonoscopy at age 44 for screening purposes.

## 2025-02-04 ENCOUNTER — OFFICE VISIT (OUTPATIENT)
Dept: GASTROENTEROLOGY | Facility: CLINIC | Age: 30
End: 2025-02-04
Payer: MEDICARE

## 2025-02-04 VITALS
OXYGEN SATURATION: 99 % | HEART RATE: 114 BPM | WEIGHT: 101.4 LBS | BODY MASS INDEX: 17.41 KG/M2 | DIASTOLIC BLOOD PRESSURE: 73 MMHG | SYSTOLIC BLOOD PRESSURE: 108 MMHG

## 2025-02-04 DIAGNOSIS — R11.2 NAUSEA AND VOMITING, UNSPECIFIED VOMITING TYPE: ICD-10-CM

## 2025-02-04 DIAGNOSIS — R10.84 GENERALIZED ABDOMINAL PAIN: Primary | ICD-10-CM

## 2025-02-04 PROCEDURE — 99214 OFFICE O/P EST MOD 30 MIN: CPT

## 2025-02-04 PROCEDURE — 1160F RVW MEDS BY RX/DR IN RCRD: CPT

## 2025-02-04 PROCEDURE — 1159F MED LIST DOCD IN RCRD: CPT

## 2025-02-04 RX ORDER — PROMETHAZINE HYDROCHLORIDE 12.5 MG/1
12.5 TABLET ORAL EVERY 8 HOURS PRN
Qty: 60 TABLET | Refills: 2 | Status: SHIPPED | OUTPATIENT
Start: 2025-02-04

## 2025-02-04 RX ORDER — INSULIN LISPRO 100 [IU]/ML
INJECTION, SOLUTION INTRAVENOUS; SUBCUTANEOUS
COMMUNITY

## 2025-02-04 NOTE — PROGRESS NOTES
Chief Complaint     Follow-up (EGD/colon ) and Abdominal Pain (Pt states she is still having generalized abd pain that is daily, pt describes it as a mix between cramping and stabbing. )    Patient or patient representative verbalized consent for the use of Ambient Listening during the visit with  ROSSI Hanna for chart documentation. 2/6/2025  13:25 EST      History of Present Illness     Wanda Rivers is a 29 y.o. female who presents to [facility] for follow-up after EGD and Colonoscopy.    History of Present Illness  The patient is a 29-year-old female who presents for evaluation of abdominal pain.    She reports experiencing generalized abdominal discomfort, which she describes as more severe than cramping but not akin to stabbing pain. The pain is intermittent, often triggered by food intake, and persists throughout the day. She also experiences episodes of burning pain, likening it to an internal fire. She rates her current pain level as 6 out of 10 but notes that it can become debilitating at home, requiring assistance from her boyfriend for basic tasks such as showering. She has been unable to complete the stool studies due to inconsistent bowel movements, ranging from watery to soft and occasionally mucousy. She typically has a bowel movement every 1 to 2 days, sometimes extending to 3 days. She does not believe constipation is exacerbating her symptoms, as the pain associated with constipation differs from her current discomfort. She has not undergone a HIDA scan and retains her gallbladder. An ultrasound revealed gallbladder sludge, prompting her to inquire about potential gallbladder removal. She does not adhere to a gastric bypass diet. She reports no possibility of pregnancy. She finds relief in hot showers, abstaining from food, hot tea, and the application of a heating pad or ice pack on her back. She also massages her back and stomach. She has been  "prescribed Protonix 40 mg daily and Carafate slurry 4 times daily, which have provided some relief. She expresses concern about the discontinuation of her acid reflux medications, as she previously experienced severe pain when she forgot to take them. She recalls waking up at night due to acid reflux and vomiting, but these symptoms have subsided since the medication change.    She also reports nausea, which is somewhat alleviated by Zofran.    MEDICATIONS  Current: Protonix, Carafate, Zofran    Patient hasn't completed the HIDA of scan. Cx of generalizes abdominal described as stabbing and cramping.     1/22/2025 EGD and Colonoscopy performed by Dr. Tequila Jimenez I have reviewed upper endoscopy. No specimens collected. Gastric bypass diet. No aspirin, ibuprofen, naproxen, or other non-steroidal anti-inflammatory drugs. Protonix 40 mg orally daily and Carafate 1gm slurry 4 times daily. No repeat EGD recommended at this time.     I have reviewed the patients colonoscopy report.  Right and left colon biopsies negative for microscopic colitis. The report showed a normal colonoscopy.  Repeat colonoscopy at age 44 for screening purposes.     12/1/2023 NM gastric emptying study: normal.      11/12/2024 Office Visit: Patient reports episodes of nausea, vomiting, dry heaving, and abdominal pain that can be trigger by eating and drinking. She explains these episodes cause her to lose weight. She explains she is current at the end of a recent \"episode\". Over the past two and a half weeks she lost 14 lbs. Experiences breakthrough heartburn despite taking Pepcid and Omeprazole. Denies trouble swallowing. Over the past few months her diet consists of eating eggs, peanut butter sandwiches, Ramen noodles, grilled cheese, Wendys, and Dunkins. She reports her A1C is 6.2 down from 12 in Dec 2023. Her average blood sugar level is 128 on her Dexcom. Reports GI intolerance to protein supplements.      Her typical bowel regiment consists of 1 " "BM every 1-2 days. Typical stool is formed, semi-hard, and light brown. During an \"episode\" stool described as diarrhea, mucoid, with variations of yellow, tan, and brown in color. Denies hematochezia or melena. She reports epigastric pain and RUQ pain described as a burning and sore pain, aggravated by eating. Alleviated by heat and massages. Lower abdominal pain described as aching, cramping, burning, and stabbing pain. Relieved with back massage. Denies aggravating factors.     Patient denies family history of colon cancer or of colon polyps. Patient's last EGD/Colonoscopy 2022 and had a feeding tube placed (removed after 2-3 months).    10/24/2023 office visit with Catherine Martinez: Patient was previously seen by ROSSI Coronado for diabetic gastroparesis associated with type 1 diabetes mellitus. She reports being diagnosed with type 2 diabetes in .  It was recommended that she undergo gastric bypass for weight loss at this time to help with type 2 diabetes.  Reports that she weighed 201 pounds.  Underwent gastric bypass in  and \"almost .\"  Then was diagnosed with type 1 diabetes. In  she was diagnosed with gastroparesis and was continuing to lose weight.  Her lowest weight was 69 pounds following the death of her father. In  all of her teeth were removed. Last July [place] tube was placed at [facility] however patient states that her weight loss worsened after her feeding tube placement and she requested that it be removed in October.  Reports upper abdominal pain that is worse following meals.  When pain is present it is improved with a heating pad and smoking marijuana. She reported trouble with nausea and vomiting after meals. Her symptoms improved after Omnipod and Dexcom use for the management of her diabetes.      History      Past Medical History:   Diagnosis Date    Diabetes mellitus type 1     Disease of thyroid gland     Gastroparesis      Past Surgical History:   Procedure " Laterality Date    COLONOSCOPY      COLONOSCOPY N/A 1/22/2025    Procedure: COLONOSCOPY WITH BIOPSIES;  Surgeon: Arnoldo Mandel MD;  Location: [facility] ENDOSCOPY;  Service: Gastroenterology;  Laterality: N/A;  NORMAL COLON    ENDOSCOPY N/A 1/22/2025    Procedure: ESOPHAGOGASTRODUODENOSCOPY;  Surgeon: Arnoldo Mandel MD;  Location: [facility] ENDOSCOPY;  Service: Gastroenterology;  Laterality: N/A;  5 CM GASTRIC POUCH, SMALL BOWEL EROSIONS    GASTRIC BYPASS      SMALL INTESTINE SURGERY      UPPER GASTROINTESTINAL ENDOSCOPY       Family History   Problem Relation Age of Onset    Colon cancer Neg Hx         Current Medications       Current Outpatient Medications:     Continuous Glucose  (Dexcom G7 ) device, Use., Disp: , Rfl:     Continuous Glucose Sensor (Dexcom G7 Sensor) misc, Use., Disp: , Rfl:     Glucagon (Gvoke Kit) 1 MG/0.2ML solution, , Disp: , Rfl:     Insulin Disposable Pump (Omnipod 5 [id number] Pods Gen 5) misc, , Disp: , Rfl:     Insulin Lispro (humaLOG) 100 UNIT/ML injection, , Disp: , Rfl:     levothyroxine (SYNTHROID, LEVOTHROID) 88 MCG tablet, Take 1 tablet by mouth Every Morning., Disp: , Rfl:     ondansetron ODT (ZOFRAN-ODT) 8 MG disintegrating tablet, Place 1 tablet on the tongue Every 8 (Eight) Hours As Needed for Nausea or Vomiting., Disp: 30 tablet, Rfl: 1    pantoprazole (Protonix) 40 MG EC tablet, Take 1 tablet by mouth Daily for 180 days., Disp: 90 tablet, Rfl: 1    sertraline (ZOLOFT) 50 MG tablet, , Disp: , Rfl:     sucralfate (Carafate) 1 g tablet, Take 1 tablet by mouth 4 (Four) Times a Day for 90 days., Disp: 120 tablet, Rfl: 2    busPIRone (BUSPAR) 5 MG tablet, TAKE 1 TABLET BY MOUTH 2 TIMES A DAY FOR ANXIETY (Patient not taking: Reported on 2/4/2025), Disp: , Rfl:     D3 Super Strength 50 MCG (2000 UT) capsule, , Disp: , Rfl:     Diclofenac Sodium (VOLTAREN) 1 % gel gel, , Disp: , Rfl:     gabapentin (NEURONTIN) 300 MG capsule, Take 1 capsule 3 times a day by oral route  "for 7 days. (Patient not taking: Reported on 2/4/2025), Disp: , Rfl:     lisinopril (PRINIVIL,ZESTRIL) 2.5 MG tablet, , Disp: , Rfl:     NovoLOG 100 UNIT/ML injection, , Disp: , Rfl:     promethazine (PHENERGAN) 12.5 MG tablet, Take 1 tablet by mouth Every 8 (Eight) Hours As Needed for Nausea or Vomiting., Disp: 60 tablet, Rfl: 2     Allergies     No Known Allergies    Social History       Social History     Social History Narrative    Not on file         Objective       /73 (BP Location: Right arm, Patient Position: Sitting, Cuff Size: Adult)   Pulse 114   Wt 46 kg (101 lb 6.4 oz)   SpO2 99%   BMI 17.41 kg/m²       Physical Exam  HENT:      Head: Normocephalic.   Pulmonary:      Effort: Pulmonary effort is normal.   Abdominal:      General: Abdomen is flat.   Musculoskeletal:         General: Normal range of motion.   Neurological:      General: No focal deficit present.      Mental Status: She is alert.   Psychiatric:         Mood and Affect: Mood normal.               Results       Result Review :    The following data was reviewed by: ROSSI Hanna on 02/04/2025:    No results for input(s): \"WBC\", \"HGB\", \"MCV\", \"PLT\" in the last 47479 hours.    Invalid input(s): \"NEUT\"      No results for input(s): \"BUN\", \"CREATININE\", \"NA\", \"K\", \"CL\", \"CO2\", \"GLUCOSE\" in the last 16367 hours.   No results for input(s): \"PROTIME\", \"INR\", \"PTT\" in the last 16052 hours.  No results for input(s): \"AST\", \"ALT\", \"ALKPHOS\", \"BILITOT\", \"BILIDIR\", \"PROTEINTOT\", \"ALBUMIN\" in the last 89160 hours.   No results for input(s): \"IRON\", \"TRANSFERRIN\", \"TIBC\", \"FERRITIN\", \"WSIIDSMQ65\", \"FOLATE\" in the last 87467 hours.  No results for input(s): \"HAV\", \"HEPAIGM\", \"HEPBIGM\", \"HEPBCAB\", \"HBEAG\", \"HEPCAB\" in the last 62235 hours.    US Abdomen Limited    Result Date: 1/6/2025  Impression: No acute sonographic abnormality is identified. Electronically Signed: Kleber Vitale MD  1/6/2025 8:43 AM EST  Workstation ID: " [id number]      Results      Imaging  Ultrasound showed gallbladder sludge. Liver appeared normal. No evidence of stones.             Assessment and Plan              Diagnoses and all orders for this visit:    1. Generalized abdominal pain (Primary)  -     CT Abdomen Pelvis With & Without Contrast; Future    2. Nausea and vomiting, unspecified vomiting type  -     promethazine (PHENERGAN) 12.5 MG tablet; Take 1 tablet by mouth Every 8 (Eight) Hours As Needed for Nausea or Vomiting.  Dispense: 60 tablet; Refill: 2        Assessment & Plan  1. Abdominal pain.  The patient reports generalized abdominal pain that is more severe than cramping but not stabbing, which comes and goes, especially after eating. The pain is somewhat alleviated by hot showers, hot tea, heating pads, and massaging the stomach and back. Previous colonoscopy and EGD results were normal, ruling out IBD. The patient has a history of gallbladder sludge seen on ultrasound. A CT scan of the abdomen and pelvis will be ordered to further investigate the cause of the pain. The patient is advised to complete the stool study using the provided kit, ensuring the sample is collected during a period of loose stools. A HIDA scan will be rescheduled to assess gallbladder function. The patient is advised to follow the gastric bypass diet, including drinking 64 ounces of fluid daily, eating lean protein-rich foods, and avoiding alcohol, caffeine, and high-fat or high-sugar foods. If the HIDA scan shows poor gallbladder function, a referral for gallbladder removal will be considered.    2. Nausea.  The patient experiences significant nausea, which is sometimes alleviated by Zofran. A prescription for Phenergan has been sent to [facility]. The patient is advised not to operate vehicles while taking Phenergan due to its potential to cause drowsiness.    Follow-up  The patient will follow up in 3 months.    PROCEDURE  Colonoscopy showed normal mucosa throughout the colon,  nonbleeding, nonthrombosed, nonprolapsed external and internal hemorrhoids. Right and left biopsies were negative for microscopic colitis.        * Surgery not found *    Follow Up     Follow Up   Return in about 3 months (around 5/4/2025).  Patient was given instructions and counseling regarding her condition or for health maintenance advice. Please see specific information pulled into the AVS if appropriate.

## 2025-02-05 ENCOUNTER — TELEPHONE (OUTPATIENT)
Dept: GASTROENTEROLOGY | Facility: CLINIC | Age: 30
End: 2025-02-05
Payer: MEDICARE

## 2025-02-14 ENCOUNTER — HOSPITAL ENCOUNTER (OUTPATIENT)
Dept: NUCLEAR MEDICINE | Facility: HOSPITAL | Age: 30
Discharge: HOME | End: 2025-02-14
Payer: MEDICARE

## 2025-02-14 DIAGNOSIS — R10.11 RUQ PAIN: ICD-10-CM

## 2025-03-10 ENCOUNTER — TELEPHONE (OUTPATIENT)
Dept: GASTROENTEROLOGY | Facility: CLINIC | Age: 30
End: 2025-03-10
Payer: MEDICARE

## 2025-04-10 ENCOUNTER — TELEPHONE (OUTPATIENT)
Dept: GASTROENTEROLOGY | Facility: CLINIC | Age: 30
End: 2025-04-10

## (undated) DEVICE — CONN JET HYDRA H20 AUXILIARY DISP

## (undated) DEVICE — DISPOSABLE DISTAL ATTACHMENT: Brand: DISPOSABLE DISTAL ATTACHMENT

## (undated) DEVICE — Device

## (undated) DEVICE — Device: Brand: DEFENDO AIR/WATER/SUCTION AND BIOPSY VALVE

## (undated) DEVICE — LINER SURG CANSTR SXN S/RIGD 1500CC

## (undated) DEVICE — BLCK/BITE BLOX WO/DENTL/RIM W/STRAP 54F

## (undated) DEVICE — SINGLE-USE BIOPSY FORCEPS: Brand: RADIAL JAW 4

## (undated) DEVICE — SOL IRRG H2O PL/BG 1000ML STRL

## (undated) DEVICE — SOLIDIFIER LIQLOC PLS 1500CC BT